# Patient Record
Sex: FEMALE | Employment: OTHER | URBAN - METROPOLITAN AREA
[De-identification: names, ages, dates, MRNs, and addresses within clinical notes are randomized per-mention and may not be internally consistent; named-entity substitution may affect disease eponyms.]

---

## 2019-08-27 ENCOUNTER — THERAPY VISIT (OUTPATIENT)
Dept: PHYSICAL THERAPY | Facility: CLINIC | Age: 74
End: 2019-08-27
Payer: MEDICARE

## 2019-08-27 DIAGNOSIS — M54.50 ACUTE BILATERAL LOW BACK PAIN WITHOUT SCIATICA: ICD-10-CM

## 2019-08-27 PROCEDURE — 97140 MANUAL THERAPY 1/> REGIONS: CPT | Mod: GP | Performed by: PHYSICAL THERAPIST

## 2019-08-27 PROCEDURE — 97161 PT EVAL LOW COMPLEX 20 MIN: CPT | Mod: GP | Performed by: PHYSICAL THERAPIST

## 2019-08-27 PROCEDURE — 97110 THERAPEUTIC EXERCISES: CPT | Mod: GP | Performed by: PHYSICAL THERAPIST

## 2019-08-27 NOTE — PROGRESS NOTES
[facility] Initial Evaluation  Subjective:  The history is provided by the patient. No  was used.   Type of problem:  Lumbar   Condition occurred with:  A fall/slip. This is a new condition   Problem details: I fell on 6/28/2019, I was walking and tripped over a lip on the cement and landed on my side.  I could not get up and had the wind knocked out of me. I had to be picked up.  I had just gotten shots in the knees.  I have had back aches and SI pain.  I saw an osteopath and she did some manipulation and felt better but did not last   I can stand about 10 min.  Sitting is minimal.  I could not travel.   Left TKA planned for November.   Patient reports pain:  Central lumbar spine, lower lumbar spine, mid lumbar spine and upper lumbar spine (initially between the shoulder blade). Radiates to:  Gluteals left, gluteals right and lower leg left. Associated symptoms:  Fatigue. Symptoms are exacerbated by standing, lying down, sitting and walking Relieved by: none, change of positions frequently.    Shoshana Hart being seen for low back pain .   Problem began 6/28/2019. Where condition occurred: in the community.Problem occurred: fell  and reported as 7/10 on pain scale. General health as reported by patient is good. Pertinent medical history includes:  High blood pressure, overweight and osteoarthritis. Other medical history details: exposed to chemicals and can't take a lot of medication , bilateral knee pain .   Other medical allergies details: none reported.  Surgeries include:  None.   Other medications details: water pills, injections into knees .    Other job/home tasks details: working with clients.  Pain is described as aching and sharp and is constant. Pain is worse in the A.M.. Since onset symptoms are gradually worsening. Imaging testing: none. Past treatment: none, osteopath. There was moderate improvement following previous treatment.   Patient is retired, mental  health tech. Work activity restrictions: watching grandson, a couple of days a week.      Home/work barriers: apartment, walk-in   Red flags:  None as reported by patient.                      Objective:  Standing Alignment:    Cervical/Thoracic:  Forward head (fair minus sitting posture)  Shoulder/UE:  Rounded shoulders  Lumbar:  Lordosis decr (lean forward)      Knee deviations alignment: bilateral knee flexion.      Gait:      Deviations:  Hip:  Decr dynamic control R and decr dynamic control LKnee:  Knee extension decr L    Flexibility/Screens:   Positive screens:  Lumbar; SI Joint and Knee (tightness)Negative screens: Hip (tightness)     Lower Extremity:  Decreased left lower extremity flexibility:Hip Flexors; Quadriceps; Hamstrings and Gastroc    Decreased right lower extremity flexibility:  Hip Flexors; Quadriceps; Hamstrings and Gastroc               Lumbar/SI Evaluation  ROM:    AROM Lumbar:   Flexion:        Flexion toes no change in pain   Ext:                    0 pain at end range   Side Bend:        Left:     Right:   Rotation:           Left:     Right:   Side Glide:        Left:     Right:         Strength: TA 1/5, unable to get knees straight  Lumbar Myotomes:    T12-L3 (Hip Flex):  Left: 5    Right: 5  L2-4 (Quads):  Left:  5    Right:  5  L4 (Ankle DF):  Left:  5    Right:  5  L5 (Great Toe Ext): Left: 5    Right: 5           Neural Tension/Mobility:      Left side:Slump  negative.     Right side:   Slump  negative.   Lumbar Palpation:    Tenderness present at Left:    Quadratus Lumborum; Erector Spinae; Piriformis; PSIS; ASIS; Iliac Crest; Gluteus Medius; Hip Flexors and Vertebral  Tenderness present at Right: Quadratus Lumborum; Erector Spinae; Piriformis; PSIS; ASIS; Iliac Crest; Gluteus Medius; Hip Flexors and Vertebral  Functional Tests:  Core strength and proprioception lumbar: unable to balance.            SI joint/Sacrum:        Thigh thrust left: SI compression/distraction painful with  pressure.                                                   General     ROS    Assessment/Plan:    Patient is a 74 year old female with lumbar and SI pain complaints.    Patient has the following significant findings with corresponding treatment plan.                Diagnosis 1:  Low back and SI pain, complicated by bilateral knee pain   Pain -  manual therapy, self management, education, directional preference exercise and home program  Decreased ROM/flexibility - manual therapy, therapeutic exercise, therapeutic activity and home program  Decreased strength - therapeutic exercise, therapeutic activities and home program  Impaired balance - neuro re-education, gait training, therapeutic activities and home program  Impaired gait - gait training and home program  Impaired muscle performance - neuro re-education and home program  Decreased function - therapeutic activities and home program  Impaired posture - neuro re-education, therapeutic activities and home program  Evaluation ongoing.    Therapy Evaluation Codes:   Cumulative Therapy Evaluation is: Low complexity.    Previous and current functional limitations:  (See Goal Flow Sheet for this information)    Short term and Long term goals: (See Goal Flow Sheet for this information)     Communication ability:  Patient appears to be able to clearly communicate and understand verbal and written communication and follow directions correctly.  Treatment Explanation - The following has been discussed with the patient:   RX ordered/plan of care  This patient would benefit from PT intervention to resume normal activities.   Rehab potential is good.    Frequency:  1 X week, once daily  Duration:  for 8 weeks  Discharge Plan:  Achieve all LTG.  Independent in home treatment program.    Please refer to the daily flowsheet for treatment today, total treatment time and time spent performing 1:1 timed codes.

## 2019-08-27 NOTE — LETTER
DEPARTMENT OF HEALTH AND HUMAN SERVICES  CENTERS FOR MEDICARE & MEDICAID SERVICES    PLAN/UPDATED PLAN OF PROGRESS FOR OUTPATIENT REHABILITATION    PATIENTS NAME:  Shoshana Hart   : 1945  PROVIDER NUMBER:    [id number]    HICN:  [id number]     PROVIDER NAME: [facility]    MEDICAL RECORD NUMBER: [id number]     START OF CARE DATE:  SOC Date: 19   TYPE:  PT    PRIMARY/TREATMENT DIAGNOSIS: (Pertinent Medical Diagnosis)  Acute bilateral low back pain without sciatica    VISITS FROM START OF CARE:  Rxs Used: 1     [facility] Initial Evaluation    Subjective:  The history is provided by the patient. No  was used.   Type of problem:  Lumbar  Condition occurred with:  A fall/slip. This is a new condition   Problem details: I fell on 2019, I was walking and tripped over a lip on the cement and landed on my side.  I could not get up and had the wind knocked out of me. I had to be picked up.  I had just gotten shots in the knees.  I have had back aches and SI pain.  I saw an osteopath and she did some manipulation and felt better but did not last   I can stand about 10 min.  Sitting is minimal.  I could not travel.   Left TKA planned for November.   Patient reports pain:  Central lumbar spine, lower lumbar spine, mid lumbar spine and upper lumbar spine (initially between the shoulder blade). Radiates to:  Gluteals left, gluteals right and lower leg left. Associated symptoms:  Fatigue. Symptoms are exacerbated by standing, lying down, sitting and walking Relieved by: none, change of positions frequently.  Shoshana Hart being seen for low back pain .   Problem began 2019. Where condition occurred: in the community.Problem occurred: fell  and reported as 7/10 on pain scale. General health as reported by patient is good. Pertinent medical history includes:  High blood pressure, overweight and  osteoarthritis. Other medical history details: exposed to chemicals and can't take a lot of medication , bilateral knee pain .   Other medical allergies details: none reported.  Surgeries include:  None.   Other medications details: water pills, injections into knees .    Other job/home tasks details: working with clients.  Pain is described as aching and sharp and is constant. Pain is worse in the A.M.. Since onset symptoms are gradually worsening. Imaging testing: none. Past treatment: none, osteopath. There was moderate improvement following previous treatment.   Patient is retired, mental health tech. Work activity restrictions: watching [employer], a couple of days a week.    Home/work barriers: apartment, walk-in   Red flags:  None as reported by patient.                  Objective:  Standing Alignment:    PATIENTS NAME:  Shoshana Hart   : 1945    Cervical/Thoracic:  Forward head (fair minus sitting posture)  Shoulder/UE:  Rounded shoulders  Lumbar:  Lordosis decr (lean forward)  Knee deviations alignment: bilateral knee flexion.  Gait:    Deviations:  Hip:  Decr dynamic control R and decr dynamic control LKnee:  Knee extension decr L  Flexibility/Screens:   Positive screens:  Lumbar; SI Joint and Knee (tightness)Negative screens: Hip (tightness)   Lower Extremity:  Decreased left lower extremity flexibility:Hip Flexors; Quadriceps; Hamstrings and Gastroc  Decreased right lower extremity flexibility:  Hip Flexors; Quadriceps; Hamstrings and Gastroc       Lumbar/SI Evaluation  ROM:    AROM Lumbar:   Flexion:        Flexion toes no change in pain   Ext:                    0 pain at end range   Side Bend:        Left:     Right:   Rotation:           Left:     Right:   Side Glide:        Left:     Right:   Strength: TA 1/5, unable to get knees straight  Lumbar Myotomes:    T12-L3 (Hip Flex):  Left: 5    Right: 5  L2-4 (Quads):  Left:  5    Right:  5  L4 (Ankle DF):  Left:  5    Right:  5  L5 (Great Toe  Ext): Left: 5    Right: 5   Neural Tension/Mobility:    Left side:Slump  negative.   Right side:   Slump  negative.   Lumbar Palpation:    Tenderness present at Left:    Quadratus Lumborum; Erector Spinae; Piriformis; PSIS; ASIS; Iliac Crest; Gluteus Medius; Hip Flexors and Vertebral  Tenderness present at Right: Quadratus Lumborum; Erector Spinae; Piriformis; PSIS; ASIS; Iliac Crest; Gluteus Medius; Hip Flexors and Vertebral  Functional Tests:  Core strength and proprioception lumbar: unable to balance.  SI joint/Sacrum:     Thigh thrust left: SI compression/distraction painful with pressure.    Assessment/Plan:    Patient is a 74 year old female with lumbar and SI pain complaints.    Patient has the following significant findings with corresponding treatment plan.                Diagnosis 1:  Low back and SI pain, complicated by bilateral knee pain   Pain -  manual therapy, self management, education, directional preference exercise and home program  Decreased ROM/flexibility - manual therapy, therapeutic exercise, therapeutic activity and home program  Decreased strength - therapeutic exercise, therapeutic activities and home program  Impaired balance - neuro re-education, gait training, therapeutic activities and home   PATIENTS NAME:  Shoshana Hart   : 1945    program  Impaired gait - gait training and home program  Impaired muscle performance - neuro re-education and home program  Decreased function - therapeutic activities and home program  Impaired posture - neuro re-education, therapeutic activities and home program  Evaluation ongoing.    Therapy Evaluation Codes:   Cumulative Therapy Evaluation is: Low complexity.    Previous and current functional limitations:  (See Goal Flow Sheet for this information)    Short term and Long term goals: (See Goal Flow Sheet for this information)     Communication ability:  Patient appears to be able to clearly communicate and understand verbal and written  "communication and follow directions correctly.  Treatment Explanation - The following has been discussed with the patient:   RX ordered/plan of care  This patient would benefit from PT intervention to resume normal activities.   Rehab potential is good.    Frequency:  1 X week, once daily  Duration:  for 8 weeks  Discharge Plan:  Achieve all LTG.  Independent in home treatment program.    Caregiver Signature/Credentials _____________________________ Date ________       Treating Provider: Luci Fonseca,  PT      I have reviewed and certified the need for these services and plan of treatment while under my care.        PHYSICIAN'S SIGNATURE:   _________________________________________  Date___________   Igor Darby DO    Certification period:  Beginning of Cert date period: 08/27/19 to  End of Cert period date: 11/05/19     Functional Level Progress Report: Please see attached \"Goal Flow sheet for Functional level.\"    ____X____ Continue Services or       ________ DC Services                Service dates: From  SOC Date: 08/27/19 date to present                         "

## 2019-08-28 PROBLEM — M54.50 ACUTE BILATERAL LOW BACK PAIN WITHOUT SCIATICA: Status: ACTIVE | Noted: 2019-08-28

## 2019-09-04 ENCOUNTER — THERAPY VISIT (OUTPATIENT)
Dept: PHYSICAL THERAPY | Facility: CLINIC | Age: 74
End: 2019-09-04
Payer: MEDICARE

## 2019-09-04 DIAGNOSIS — M54.50 ACUTE BILATERAL LOW BACK PAIN WITHOUT SCIATICA: ICD-10-CM

## 2019-09-04 PROCEDURE — 97110 THERAPEUTIC EXERCISES: CPT | Mod: GP | Performed by: PHYSICAL THERAPIST

## 2019-09-04 PROCEDURE — 97140 MANUAL THERAPY 1/> REGIONS: CPT | Mod: GP | Performed by: PHYSICAL THERAPIST

## 2019-09-18 ENCOUNTER — THERAPY VISIT (OUTPATIENT)
Dept: PHYSICAL THERAPY | Facility: CLINIC | Age: 74
End: 2019-09-18
Payer: MEDICARE

## 2019-09-18 DIAGNOSIS — M54.50 ACUTE BILATERAL LOW BACK PAIN WITHOUT SCIATICA: ICD-10-CM

## 2019-09-18 PROCEDURE — 97110 THERAPEUTIC EXERCISES: CPT | Mod: GP | Performed by: PHYSICAL THERAPIST

## 2019-09-18 PROCEDURE — 97140 MANUAL THERAPY 1/> REGIONS: CPT | Mod: GP | Performed by: PHYSICAL THERAPIST

## 2019-09-25 ENCOUNTER — THERAPY VISIT (OUTPATIENT)
Dept: PHYSICAL THERAPY | Facility: CLINIC | Age: 74
End: 2019-09-25
Payer: MEDICARE

## 2019-09-25 DIAGNOSIS — M54.50 ACUTE BILATERAL LOW BACK PAIN WITHOUT SCIATICA: ICD-10-CM

## 2019-09-25 PROCEDURE — 97140 MANUAL THERAPY 1/> REGIONS: CPT | Mod: GP | Performed by: PHYSICAL THERAPIST

## 2019-09-25 PROCEDURE — 97112 NEUROMUSCULAR REEDUCATION: CPT | Mod: GP | Performed by: PHYSICAL THERAPIST

## 2019-09-25 PROCEDURE — 97110 THERAPEUTIC EXERCISES: CPT | Mod: GP | Performed by: PHYSICAL THERAPIST

## 2019-10-02 ENCOUNTER — THERAPY VISIT (OUTPATIENT)
Dept: PHYSICAL THERAPY | Facility: CLINIC | Age: 74
End: 2019-10-02
Payer: MEDICARE

## 2019-10-02 DIAGNOSIS — M54.50 ACUTE BILATERAL LOW BACK PAIN WITHOUT SCIATICA: ICD-10-CM

## 2019-10-02 PROCEDURE — 97140 MANUAL THERAPY 1/> REGIONS: CPT | Mod: GP | Performed by: PHYSICAL THERAPIST

## 2019-10-02 PROCEDURE — 97110 THERAPEUTIC EXERCISES: CPT | Mod: GP | Performed by: PHYSICAL THERAPIST

## 2019-10-09 ENCOUNTER — THERAPY VISIT (OUTPATIENT)
Dept: PHYSICAL THERAPY | Facility: CLINIC | Age: 74
End: 2019-10-09
Payer: MEDICARE

## 2019-10-09 DIAGNOSIS — M54.50 ACUTE BILATERAL LOW BACK PAIN WITHOUT SCIATICA: ICD-10-CM

## 2019-10-09 PROCEDURE — 97140 MANUAL THERAPY 1/> REGIONS: CPT | Mod: GP | Performed by: PHYSICAL THERAPIST

## 2019-10-09 PROCEDURE — 97110 THERAPEUTIC EXERCISES: CPT | Mod: GP | Performed by: PHYSICAL THERAPIST

## 2019-10-16 ENCOUNTER — THERAPY VISIT (OUTPATIENT)
Dept: PHYSICAL THERAPY | Facility: CLINIC | Age: 74
End: 2019-10-16
Payer: MEDICARE

## 2019-10-16 DIAGNOSIS — M54.50 ACUTE BILATERAL LOW BACK PAIN WITHOUT SCIATICA: ICD-10-CM

## 2019-10-16 PROCEDURE — 97110 THERAPEUTIC EXERCISES: CPT | Mod: GP | Performed by: PHYSICAL THERAPIST

## 2019-10-16 PROCEDURE — 97140 MANUAL THERAPY 1/> REGIONS: CPT | Mod: GP | Performed by: PHYSICAL THERAPIST

## 2019-10-23 ENCOUNTER — THERAPY VISIT (OUTPATIENT)
Dept: PHYSICAL THERAPY | Facility: CLINIC | Age: 74
End: 2019-10-23
Payer: MEDICARE

## 2019-10-23 DIAGNOSIS — M54.50 ACUTE BILATERAL LOW BACK PAIN WITHOUT SCIATICA: ICD-10-CM

## 2019-10-23 PROCEDURE — 97140 MANUAL THERAPY 1/> REGIONS: CPT | Mod: GP | Performed by: PHYSICAL THERAPIST

## 2019-10-23 PROCEDURE — 97110 THERAPEUTIC EXERCISES: CPT | Mod: GP | Performed by: PHYSICAL THERAPIST

## 2019-10-23 NOTE — LETTER
DEPARTMENT OF HEALTH AND HUMAN SERVICES  CENTERS FOR MEDICARE & MEDICAID SERVICES    PLAN/UPDATED PLAN OF PROGRESS FOR OUTPATIENT REHABILITATION    PATIENTS NAME:  Shoshana Hart   : 1945  PROVIDER NUMBER:    [id number]    HICN:  [id number]     PROVIDER NAME: [facility]    MEDICAL RECORD NUMBER: [id number]     START OF CARE DATE:  SOC Date: 19   TYPE:  PT    PRIMARY/TREATMENT DIAGNOSIS: (Pertinent Medical Diagnosis)  Acute bilateral low back pain without sciatica    VISITS FROM START OF CARE:  Rxs Used: 8     PROGRESS  REPORT  Progress reporting period is from 2019 to 10/23/2019.       SUBJECTIVE  Subjective changes noted by patient:  I did not go to acupuncturist yet.  I am able to stand longer to walk, do dishes, cut food.      Current Pain level: 6/10.     Previous pain level was  7/10  .   Changes in function:  Progress has been slow, Oswestry has shown improvement  Adverse reaction to treatment or activity: treatment - felt sore need heat after one visit.  HPI  Oswestry Score: 40 %     OBJECTIVE  Changes noted in objective findings:  Yes,  TROM flexion fingers to the floor, did not change the back pain, extension  WFL, tightness in hip flexion.   Balance poor. TA 1+/5.  Tightness in hip flexors and quadriceps.  Transfers are improving slowly but still labored.    ASSESSMENT/PLAN  Updated problem list and treatment plan: Diagnosis 1:  Low back pain with SI dysfunction  Pain -  manual therapy, self management, education and home program  Decreased strength - therapeutic exercise, therapeutic activities and home program  Impaired balance - neuro re-education, therapeutic activities and home program  Impaired muscle performance - neuro re-education and home program  Decreased function - therapeutic activities and home program  STG/LTGs have been met or progress has been made towards goals:  Yes (See Goal flow sheet completed  "today.)  Assessment of Progress: The patient's condition is improving.  The patient's condition has potential to improve.  Self Management Plans:  Patient has been instructed in a home treatment program.  Patient  has been instructed in self management of symptoms.  PATIENTS NAME:  Shoshana Hart   : 1945    I have re-evaluated this patient and find that the nature, scope, duration and intensity of the therapy is appropriate for the medical condition of the patient.  Shoshana continues to require the following intervention to meet STG and LTG's:  PT    Recommendations:  This patient would benefit from continued therapy.     Frequency:  1 X week, once daily  Duration:  for 8 weeks    Patient still needs progression with strengthening and functional activity.    Caregiver Signature/Credentials _____________________________ Date ________       Treating Provider: Luci Grover,  PT      I have reviewed and certified the need for these services and plan of treatment while under my care.        PHYSICIAN'S SIGNATURE:   _________________________________________  Date___________   Igor Darby MD    Certification period:  Beginning of Cert date period: 19 to  End of Cert period date: 19     Functional Level Progress Report: Please see attached \"Goal Flow sheet for Functional level.\"    ____X____ Continue Services or       ________ DC Services                Service dates: From  SOC Date: 19 date to present                         "

## 2019-10-25 NOTE — PROGRESS NOTES
Subjective:  HPI  Oswestry Score: 40 %                 Objective:  System    Physical Exam    General     ROS    Assessment/Plan:    PROGRESS  REPORT    Progress reporting period is from 8/27/2019 to 10/23/2019.       SUBJECTIVE  Subjective changes noted by patient:  I did not go to acupuncturist yet.  I am able to stand longer to walk, do dishes, cut food.       Current Pain level: 6/10.     Previous pain level was  7/10  .   Changes in function:  Progress has been slow, Oswestry has shown improvement  Adverse reaction to treatment or activity: treatment - felt sore need heat after one visit.    OBJECTIVE  Changes noted in objective findings:  Yes,  TROM flexion fingers to the floor, did not change the back pain, extension  WFL, tightness in hip flexion.   Balance poor. TA 1+/5.  Tightness in hip flexors and quadriceps.  Transfers are improving slowly but still labored.    ASSESSMENT/PLAN  Updated problem list and treatment plan: Diagnosis 1:  Low back pain with SI dysfunction  Pain -  manual therapy, self management, education and home program  Decreased strength - therapeutic exercise, therapeutic activities and home program  Impaired balance - neuro re-education, therapeutic activities and home program  Impaired muscle performance - neuro re-education and home program  Decreased function - therapeutic activities and home program  STG/LTGs have been met or progress has been made towards goals:  Yes (See Goal flow sheet completed today.)  Assessment of Progress: The patient's condition is improving.  The patient's condition has potential to improve.  Self Management Plans:  Patient has been instructed in a home treatment program.  Patient  has been instructed in self management of symptoms.  I have re-evaluated this patient and find that the nature, scope, duration and intensity of the therapy is appropriate for the medical condition of the patient.  Shoshana continues to require the following intervention to  meet STG and LTG's:  PT    Recommendations:  This patient would benefit from continued therapy.     Frequency:  1 X week, once daily  Duration:  for 8 weeks    Patient still needs progression with strengthening and functional activity.    Please refer to the daily flowsheet for treatment today, total treatment time and time spent performing 1:1 timed codes.

## 2019-10-30 ENCOUNTER — THERAPY VISIT (OUTPATIENT)
Dept: PHYSICAL THERAPY | Facility: CLINIC | Age: 74
End: 2019-10-30
Payer: MEDICARE

## 2019-10-30 DIAGNOSIS — M54.50 ACUTE BILATERAL LOW BACK PAIN WITHOUT SCIATICA: ICD-10-CM

## 2019-10-30 PROCEDURE — 97110 THERAPEUTIC EXERCISES: CPT | Mod: GP | Performed by: PHYSICAL THERAPIST

## 2019-10-30 PROCEDURE — 97140 MANUAL THERAPY 1/> REGIONS: CPT | Mod: GP | Performed by: PHYSICAL THERAPIST

## 2019-10-30 PROCEDURE — 97112 NEUROMUSCULAR REEDUCATION: CPT | Mod: GP | Performed by: PHYSICAL THERAPIST

## 2019-11-13 ENCOUNTER — THERAPY VISIT (OUTPATIENT)
Dept: PHYSICAL THERAPY | Facility: CLINIC | Age: 74
End: 2019-11-13
Payer: MEDICARE

## 2019-11-13 DIAGNOSIS — M54.50 ACUTE BILATERAL LOW BACK PAIN WITHOUT SCIATICA: ICD-10-CM

## 2019-11-13 PROCEDURE — 97112 NEUROMUSCULAR REEDUCATION: CPT | Mod: GP | Performed by: PHYSICAL THERAPIST

## 2019-11-13 PROCEDURE — 97110 THERAPEUTIC EXERCISES: CPT | Mod: GP | Performed by: PHYSICAL THERAPIST

## 2019-11-27 ENCOUNTER — THERAPY VISIT (OUTPATIENT)
Dept: PHYSICAL THERAPY | Facility: CLINIC | Age: 74
End: 2019-11-27
Payer: MEDICARE

## 2019-11-27 DIAGNOSIS — M54.50 ACUTE BILATERAL LOW BACK PAIN WITHOUT SCIATICA: ICD-10-CM

## 2019-11-27 PROCEDURE — 97140 MANUAL THERAPY 1/> REGIONS: CPT | Mod: GP | Performed by: PHYSICAL THERAPIST

## 2019-11-27 PROCEDURE — 97110 THERAPEUTIC EXERCISES: CPT | Mod: GP | Performed by: PHYSICAL THERAPIST

## 2019-12-04 ENCOUNTER — THERAPY VISIT (OUTPATIENT)
Dept: PHYSICAL THERAPY | Facility: CLINIC | Age: 74
End: 2019-12-04
Payer: MEDICARE

## 2019-12-04 DIAGNOSIS — M54.50 ACUTE BILATERAL LOW BACK PAIN WITHOUT SCIATICA: ICD-10-CM

## 2019-12-04 PROCEDURE — 97110 THERAPEUTIC EXERCISES: CPT | Mod: GP | Performed by: PHYSICAL THERAPIST

## 2019-12-04 PROCEDURE — 97140 MANUAL THERAPY 1/> REGIONS: CPT | Mod: GP | Performed by: PHYSICAL THERAPIST

## 2024-05-14 ENCOUNTER — MEDICAL CORRESPONDENCE (OUTPATIENT)
Dept: HEALTH INFORMATION MANAGEMENT | Facility: CLINIC | Age: 79
End: 2024-05-14
Payer: MEDICARE

## 2024-05-15 ENCOUNTER — TRANSCRIBE ORDERS (OUTPATIENT)
Dept: OTHER | Age: 79
End: 2024-05-15

## 2024-05-15 DIAGNOSIS — M54.50 LOWER BACK PAIN: Primary | ICD-10-CM
